# Patient Record
Sex: FEMALE
[De-identification: names, ages, dates, MRNs, and addresses within clinical notes are randomized per-mention and may not be internally consistent; named-entity substitution may affect disease eponyms.]

---

## 2019-01-20 ENCOUNTER — HOSPITAL ENCOUNTER (EMERGENCY)
Dept: HOSPITAL 42 - ED | Age: 74
Discharge: HOME | End: 2019-01-20
Payer: MEDICARE

## 2019-01-20 VITALS
RESPIRATION RATE: 20 BRPM | TEMPERATURE: 98 F | DIASTOLIC BLOOD PRESSURE: 57 MMHG | HEART RATE: 102 BPM | SYSTOLIC BLOOD PRESSURE: 131 MMHG

## 2019-01-20 VITALS — BODY MASS INDEX: 24.5 KG/M2

## 2019-01-20 VITALS — OXYGEN SATURATION: 98 %

## 2019-01-20 DIAGNOSIS — R19.7: ICD-10-CM

## 2019-01-20 DIAGNOSIS — R11.2: Primary | ICD-10-CM

## 2019-01-20 LAB
ALBUMIN SERPL-MCNC: 4.4 G/DL (ref 3–4.8)
ALBUMIN/GLOB SERPL: 1.4 {RATIO} (ref 1.1–1.8)
ALT SERPL-CCNC: 27 U/L (ref 7–56)
AST SERPL-CCNC: 29 U/L (ref 14–36)
BUN SERPL-MCNC: 18 MG/DL (ref 7–21)
CALCIUM SERPL-MCNC: 9.3 MG/DL (ref 8.4–10.5)
ERYTHROCYTE [DISTWIDTH] IN BLOOD BY AUTOMATED COUNT: 12.7 % (ref 11.5–14.5)
GFR NON-AFRICAN AMERICAN: > 60
HGB BLD-MCNC: 13.9 G/DL (ref 12–16)
LIPASE SERPL-CCNC: 84 U/L (ref 23–300)
MCH RBC QN AUTO: 28.5 PG (ref 25–35)
MCHC RBC AUTO-ENTMCNC: 33.2 G/DL (ref 31–37)
MCV RBC AUTO: 86 FL (ref 80–105)
PLATELET # BLD: 197 10^3/UL (ref 120–450)
PMV BLD AUTO: 10.5 FL (ref 7–11)
RBC # BLD AUTO: 4.87 10^6/UL (ref 3.5–6.1)
WBC # BLD AUTO: 13.1 10^3/UL (ref 4.5–11)

## 2019-01-20 PROCEDURE — 96375 TX/PRO/DX INJ NEW DRUG ADDON: CPT

## 2019-01-20 PROCEDURE — 74177 CT ABD & PELVIS W/CONTRAST: CPT

## 2019-01-20 PROCEDURE — 96374 THER/PROPH/DIAG INJ IV PUSH: CPT

## 2019-01-20 PROCEDURE — 96361 HYDRATE IV INFUSION ADD-ON: CPT

## 2019-01-20 PROCEDURE — 83690 ASSAY OF LIPASE: CPT

## 2019-01-20 PROCEDURE — 80053 COMPREHEN METABOLIC PANEL: CPT

## 2019-01-20 PROCEDURE — 99285 EMERGENCY DEPT VISIT HI MDM: CPT

## 2019-01-20 PROCEDURE — 93005 ELECTROCARDIOGRAM TRACING: CPT

## 2019-01-20 PROCEDURE — 87804 INFLUENZA ASSAY W/OPTIC: CPT

## 2019-01-20 PROCEDURE — 85027 COMPLETE CBC AUTOMATED: CPT

## 2019-01-20 NOTE — CT
Date of service: 



01/20/2019



PROCEDURE:  CT Abdomen and Pelvis with contrast



HISTORY:

abdominal pain, vomiting



COMPARISON:

None.



TECHNIQUE:

Contrast dose: 100 mL Omnipaque 350



Radiation dose:



Total exam DLP = 432.11 mGy-cm.



This CT exam was performed using one or more of the following dose 

reduction techniques: Automated exposure control, adjustment of the 

mA and/or kV according to patient size, and/or use of iterative 

reconstruction technique.



FINDINGS:



LOWER THORAX:

Minimal subsegmental atelectasis in the lingula and medial right 

middle lobe.  Moderate hiatal hernia.  No infiltrate/effusion. 



LIVER:

Normal size, contour and attenuation.  Nonspecific 8 mm rounded 

low-attenuation lesion in lateral segment left hepatic lobe.  

Low-density 1.4 x 3.0 cm mass in caudate lobe of liver.  Additional 

very small scattered nonspecific low-attenuation lesions in liver.  

Recommend correlation with hepatic ultrasound examination.  No 

biliary ductal dilatation. 



GALLBLADDER AND BILE DUCTS:

Unremarkable. 



PANCREAS:

Unremarkable. No gross lesion or ductal dilatation.



SPLEEN:

Unremarkable. 



ADRENALS:

Unremarkable. No mass. 



KIDNEYS AND URETERS:

Unremarkable. No hydronephrosis. No solid mass. 



VASCULATURE:

Unremarkable. No aortic aneurysm. No aortic atherosclerotic 

calcification or mural plaque present.



BOWEL:

Sigmoid diverticulosis without evidence of diverticulitis.  No bowel 

obstruction.  Scattered colonic diverticula elsewhere.  



APPENDIX:

Not identified.  No secondary findings to suggest acute appendicitis. 



PERITONEUM:

Unremarkable. No free fluid. No free air. 



LYMPH NODES:

No retroperitoneal or pelvic lymphadenopathy.  Shotty subcentimeter 

lymph nodes are seen medial to the cecum/ascending colon, suggestive 

of mesenteric adenitis.  There are shotty subcentimeter nodes in the 

small bowel mesenteric common nonspecific. 



BLADDER:

Unremarkable. 



REPRODUCTIVE:

Normal uterus 



BONES:

There is a hemangioma of the T12 vertebral body.  There is no acute 

fracture. 



OTHER FINDINGS:

None.



IMPRESSION:

Possible mesenteric adenitis.  No evidence of appendicitis or bowel 

obstruction.  Multiple nonspecific low-attenuation lesions in the 

liver up to 3 cm.  Recommend correlation with hepatic ultrasound 

examination.  Additional minor findings as above.

## 2019-01-20 NOTE — ED PDOC
Arrival/HPI





- General


Chief Complaint: GI Problem


Time Seen by Provider: 01/20/19 07:17





- History of Present Illness


Narrative History of Present Illness (Text): 


72 y/o F c PMHx hypothyroidism, diverticulitis, gastric ulcers, appendectomy p/w

diffuse abdominal pain x 10 hours. Nausea and nonbloody vomiting x 6 episodes. 

Denies diarrhea, constipation, headache, chest pain, dyspnea, body aches. School

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 





Past Medical History





- Provider Review


Nursing Documentation Reviewed: Yes





- Infectious Disease


Hx of Infectious Diseases: None





- Tetanus Immunization


Tetanus Immunization: Unknown





- Endocrine/Metabolic


Hx Hyperthyroidism: Yes





- Musculoskeletal/Rheumatological


Hx Falls: No





- Gastrointestinal


Hx Gastroesophageal Reflux: Yes





- Psychiatric


Hx Substance Use: No





- Surgical History


Hx Appendectomy: Yes





- Anesthesia


Hx Anesthesia: Yes


Hx Anesthesia Reactions: No


Hx Malignant Hyperthermia: No





- Suicidal Assessment


Feels Threatened In Home Enviroment: No





Family/Social History





- Physician Review


Nursing Documentation Reviewed: Yes


Family/Social History: No Known Family HX


Smoking Status: Never Smoked


Hx Alcohol Use: No


Hx Substance Use: No


Hx Substance Use Treatment: No





Allergies/Home Meds


Allergies/Adverse Reactions: 


Allergies





codeine Allergy (Verified 01/20/19 07:25)


   ANAPHYLAXIS


diphenhydramine [From Benadryl] Allergy (Verified 01/20/19 07:25)


   ANAPHYLAXIS


Penicillins Allergy (Verified 01/20/19 07:25)


   ANAPHYLAXIS








Home Medications: 


                                    Home Meds











 Medication  Instructions  Recorded  Confirmed


 


Calcium/Vitamin D [Calcium + D 500 1 tab PO DAILY 08/24/12 01/20/19





mg-125 Iu]   


 


Levothyroxine Sodium [Synthroid] 0.025 mg PO DAILY 08/24/12 01/20/19














Review of Systems





- Physician Review


All systems were reviewed & negative as marked: Yes





- Review of Systems


Constitutional: absent: Fevers


Respiratory: absent: SOB





Physical Exam





- Physical Exam


Narrative Physical Exam (Text): 


Gen: NAD


ENT: MMM. No pharygneal erythema or exudates


Neck: Supple. No LAD


Heart: Tachycardic.


Lungs: CTA b/l


Abd: Soft, no tenderness


Back: No CVA tenderness


Extremities: No edema


Skin: No rash


Neuro: Alert, no focal deficit








Vital Signs











  Temp Pulse Resp BP Pulse Ox


 


 01/20/19 07:57   111 H   


 


 01/20/19 07:16  98.5 F  111 H  18  147/84  95














Medical Decision Making


ED Course and Treatment: 


72 y/o F p/w nausea/vomiting x 1 day.


Differential: Most likely viral gastroenteritis. Also gastric ulcer, GERD, 

esophagitis. 


R/o diverticulitis, pancreatitis, SBO.





CT shows mesenteric adenitis. Patient PO challenged. Will discharge, f/u PMD, 

return to ED for worsening pain, fever, vomiting, dyspnea, or any other problem.





Disposition/Present on Arrival





- Present on Arrival


Any Indicators Present on Arrival: No


History of DVT/PE: No


History of Uncontrolled Diabetes: No


Urinary Catheter: No


History of Decub. Ulcer: No


History Surgical Site Infection Following: None





- Disposition


Have Diagnosis and Disposition been Completed?: Yes


Diagnosis: 


 Vomiting, Diarrhea





Disposition: HOME/ ROUTINE


Disposition Time: 10:12


Patient Plan: Discharge


Condition: STABLE


Discharge Instructions (ExitCare):  Nausea and Vomiting, Adult


Additional Instructions: 


FINDINGS:





LOWER THORAX:


Minimal subsegmental atelectasis in the lingula and medial right middle lobe.  

Moderate hiatal hernia.  No infiltrate/effusion. 





LIVER:


Normal size, contour and attenuation.  Nonspecific 8 mm rounded low-attenuation 

lesion in lateral segment left hepatic lobe.  Low-density 1.4 x 3.0 cm mass in 

caudate lobe of liver.  Additional very small scattered nonspecific low-

attenuation lesions in liver.  Recommend correlation with hepatic ultrasound 

examination.  No biliary ductal dilatation. 





GALLBLADDER AND BILE DUCTS:


Unremarkable. 





PANCREAS:


Unremarkable. No gross lesion or ductal dilatation.





SPLEEN:


Unremarkable. 





ADRENALS:


Unremarkable. No mass. 





KIDNEYS AND URETERS:


Unremarkable. No hydronephrosis. No solid mass. 





VASCULATURE:


Unremarkable. No aortic aneurysm. No aortic atherosclerotic calcification or 

mural plaque present.





BOWEL:


Sigmoid diverticulosis without evidence of diverticulitis.  No bowel 

obstruction.  Scattered colonic diverticula elsewhere.  





APPENDIX:


Not identified.  No secondary findings to suggest acute appendicitis. 





PERITONEUM:


Unremarkable. No free fluid. No free air. 





LYMPH NODES:


No retroperitoneal or pelvic lymphadenopathy.  Shotty subcentimeter lymph nodes 

are seen medial to the cecum/ascending colon, suggestive of mesenteric adenitis.

  There are shotty subcentimeter nodes in the small bowel mesenteric common 

nonspecific. 





BLADDER:


Unremarkable. 





REPRODUCTIVE:


Normal uterus 





BONES:


There is a hemangioma of the T12 vertebral body.  There is no acute fracture. 





OTHER FINDINGS:


None.





IMPRESSION:


Possible mesenteric adenitis.  No evidence of appendicitis or bowel obstruction.

  Multiple nonspecific low-attenuation lesions in the liver up to 3 cm.  

Recommend correlation with hepatic ultrasound examination.  Additional minor 

findings as above.


Prescriptions: 


Ondansetron ODT [Zofran ODT] 4 mg PO Q8 #12 odt


Referrals: 


Rupal Youngblood MD [Primary Care Provider] - Follow up with primary


Forms:  [facility] (English)

## 2019-01-21 NOTE — CARD
--------------- APPROVED REPORT --------------





Date of service: 01/20/2019



EKG Measurement

Heart [id number]

MT [id number]

[id number]

[id number]

[id number]



<Conclusion>

Sinus tachycardia

Otherwise normal ECG

## 2019-01-29 ENCOUNTER — HOSPITAL ENCOUNTER (OUTPATIENT)
Dept: HOSPITAL 42 - RAD | Age: 74
End: 2019-01-29
Payer: MEDICARE

## 2021-01-28 PROBLEM — H43.393 VITREOUS FLOATERS: Noted: 2021-01-28

## 2021-01-28 PROBLEM — D31.32 CHOROIDAL NEVUS: Noted: 2021-01-28

## 2021-01-28 PROBLEM — Z96.1 PSEUDOPHAKIA: Noted: 2021-01-28

## 2021-01-29 ENCOUNTER — PREPPED CHART (OUTPATIENT)
Dept: URBAN - METROPOLITAN AREA CLINIC 21 | Facility: CLINIC | Age: 76
End: 2021-01-29

## 2023-08-21 ENCOUNTER — ESTABLISHED COMPREHENSIVE EXAM (OUTPATIENT)
Dept: URBAN - METROPOLITAN AREA CLINIC 21 | Facility: CLINIC | Age: 78
End: 2023-08-21

## 2023-08-21 DIAGNOSIS — H52.4: ICD-10-CM

## 2023-08-21 DIAGNOSIS — D31.32: ICD-10-CM

## 2023-08-21 DIAGNOSIS — H43.393: ICD-10-CM

## 2023-08-21 DIAGNOSIS — Z96.1: ICD-10-CM

## 2023-08-21 PROCEDURE — 92015 DETERMINE REFRACTIVE STATE: CPT

## 2023-08-21 PROCEDURE — 92250 FUNDUS PHOTOGRAPHY W/I&R: CPT

## 2023-08-21 PROCEDURE — 92014 COMPRE OPH EXAM EST PT 1/>: CPT

## 2023-08-21 ASSESSMENT — VISUAL ACUITY
OD_CC: J2
OS_CC: 20/30
OD_CC: 20/25
OS_CC: J3

## 2023-08-21 ASSESSMENT — TONOMETRY
OS_IOP_MMHG: 11
OD_IOP_MMHG: 10

## 2024-09-26 ENCOUNTER — ESTABLISHED COMPREHENSIVE EXAM (OUTPATIENT)
Dept: URBAN - METROPOLITAN AREA CLINIC 21 | Facility: CLINIC | Age: 79
End: 2024-09-26

## 2024-09-26 DIAGNOSIS — Z96.1: ICD-10-CM

## 2024-09-26 DIAGNOSIS — D31.32: ICD-10-CM

## 2024-09-26 DIAGNOSIS — H52.4: ICD-10-CM

## 2024-09-26 DIAGNOSIS — H43.393: ICD-10-CM

## 2024-09-26 PROCEDURE — 92014 COMPRE OPH EXAM EST PT 1/>: CPT

## 2024-09-26 PROCEDURE — 92015 DETERMINE REFRACTIVE STATE: CPT

## 2024-09-26 PROCEDURE — 92250 FUNDUS PHOTOGRAPHY W/I&R: CPT

## 2024-09-26 ASSESSMENT — KERATOMETRY
OS_AXISANGLE2_DEGREES: 173
OS_K1POWER_DIOPTERS: 45.00
OD_AXISANGLE2_DEGREES: 178
OD_AXISANGLE_DEGREES: 88
OD_K1POWER_DIOPTERS: 45.25
OD_K2POWER_DIOPTERS: 48.00
OS_AXISANGLE_DEGREES: 83
OS_K2POWER_DIOPTERS: 47.00

## 2024-09-26 ASSESSMENT — TONOMETRY
OD_IOP_MMHG: 12
OS_IOP_MMHG: 12

## 2024-09-26 ASSESSMENT — VISUAL ACUITY
OS_CC: J7
OD_CC: J7
OS_CC: 20/40
OD_CC: 20/30